# Patient Record
Sex: FEMALE | Race: WHITE | NOT HISPANIC OR LATINO
[De-identification: names, ages, dates, MRNs, and addresses within clinical notes are randomized per-mention and may not be internally consistent; named-entity substitution may affect disease eponyms.]

---

## 2020-06-26 ENCOUNTER — ERX REFILL RESPONSE (OUTPATIENT)
Age: 65
End: 2020-06-26

## 2020-06-26 RX ORDER — SUCRALFATE 1 G/1
PLEASE SEE ATTACHED FOR DETAILED DIRECTIONS TABLET ORAL
Qty: 120 | Refills: 2 | OUTPATIENT

## 2020-06-26 RX ORDER — SUCRALFATE 1 G/1
PLEASE SEE ATTACHED FOR DETAILED DIRECTIONS TABLET ORAL
Qty: 360 TABLET | Refills: 1 | OUTPATIENT

## 2020-07-07 ENCOUNTER — OFFICE VISIT (OUTPATIENT)
Dept: URBAN - METROPOLITAN AREA MEDICAL CENTER 28 | Facility: MEDICAL CENTER | Age: 65
End: 2020-07-07
Payer: COMMERCIAL

## 2020-07-07 DIAGNOSIS — K21.9 ACID REFLUX: ICD-10-CM

## 2020-07-07 DIAGNOSIS — R05 CHRONIC COUGH: ICD-10-CM

## 2020-07-07 PROCEDURE — 91010 ESOPHAGUS MOTILITY STUDY: CPT | Performed by: INTERNAL MEDICINE

## 2020-07-07 PROCEDURE — 91038 ESOPH IMPED FUNCT TEST > 1HR: CPT | Performed by: INTERNAL MEDICINE

## 2020-07-17 ENCOUNTER — OFFICE VISIT (OUTPATIENT)
Dept: URBAN - METROPOLITAN AREA TELEHEALTH 2 | Facility: TELEHEALTH | Age: 65
End: 2020-07-17
Payer: COMMERCIAL

## 2020-07-17 ENCOUNTER — DASHBOARD ENCOUNTERS (OUTPATIENT)
Age: 65
End: 2020-07-17

## 2020-07-17 DIAGNOSIS — K21.9 GERD (GASTROESOPHAGEAL REFLUX DISEASE): ICD-10-CM

## 2020-07-17 DIAGNOSIS — R05 CHRONIC COUGH: ICD-10-CM

## 2020-07-17 PROCEDURE — 3017F COLORECTAL CA SCREEN DOC REV: CPT | Performed by: INTERNAL MEDICINE

## 2020-07-17 PROCEDURE — 99214 OFFICE O/P EST MOD 30 MIN: CPT | Performed by: INTERNAL MEDICINE

## 2020-07-17 PROCEDURE — G8427 DOCREV CUR MEDS BY ELIG CLIN: HCPCS | Performed by: INTERNAL MEDICINE

## 2020-07-17 PROCEDURE — G8417 CALC BMI ABV UP PARAM F/U: HCPCS | Performed by: INTERNAL MEDICINE

## 2020-07-17 RX ORDER — SUCRALFATE 1 G/1
TAKE 1 TABLET (1 GRAM) BY ORAL ROUTE 4 TIMES PER DAY ON AN EMPTY STOMACH 1 HOUR BEFORE MEALS AND AT BEDTIME FOR 30 DAYS TABLET ORAL
OUTPATIENT
Start: 2020-05-22 | End: 2020-08-20

## 2020-07-17 RX ORDER — SUCRALFATE 1 G/1
PLEASE SEE ATTACHED FOR DETAILED DIRECTIONS TABLET ORAL
Qty: 360 TABLET | Refills: 1 | Status: ACTIVE | COMMUNITY

## 2020-07-17 RX ORDER — FAMOTIDINE 40 MG/1
TAKE 1 TABLET (40 MG) BY ORAL ROUTE ONCE DAILY AT BEDTIME FOR 90 DAYS TABLET ORAL 1
OUTPATIENT
Start: 2020-04-20 | End: 2020-10-17

## 2020-07-17 RX ORDER — PANTOPRAZOLE SODIUM 40 MG/1
TAKE 1 TABLET (40 MG) BY ORAL ROUTE 2 TIMES PER DAY FOR 90 DAYS TABLET, DELAYED RELEASE ORAL 2
OUTPATIENT
Start: 2020-05-06 | End: 2020-11-02

## 2020-07-17 RX ORDER — PANTOPRAZOLE SODIUM 40 MG/1
TAKE 1 TABLET (40 MG) BY ORAL ROUTE 2 TIMES PER DAY FOR 90 DAYS TABLET, DELAYED RELEASE ORAL 2
Qty: 180 | Refills: 1 | Status: ACTIVE | COMMUNITY
Start: 2020-05-06 | End: 2020-11-02

## 2020-07-17 RX ORDER — SUCRALFATE 1 G/1
TAKE 1 TABLET (1 GRAM) BY ORAL ROUTE 4 TIMES PER DAY ON AN EMPTY STOMACH 1 HOUR BEFORE MEALS AND AT BEDTIME FOR 30 DAYS TABLET ORAL
Qty: 120 | Refills: 2 | Status: ACTIVE | COMMUNITY
Start: 2020-05-22 | End: 2020-08-20

## 2020-07-17 RX ORDER — FAMOTIDINE 40 MG/1
TAKE 1 TABLET (40 MG) BY ORAL ROUTE ONCE DAILY AT BEDTIME FOR 90 DAYS TABLET ORAL 1
Qty: 90 | Refills: 1 | Status: ACTIVE | COMMUNITY
Start: 2020-04-20 | End: 2020-10-17

## 2020-07-17 NOTE — HPI-TODAY'S VISIT:
Patient is here in follow up. Denies new complaints Patient says the heartburn symptoms have not improved significantly. Has been taking the medications regularly Is following Lifestyle and Dietary modifications as recommended  The Esophageal manometry was d/w pt

## 2020-07-27 ENCOUNTER — ERX REFILL RESPONSE (OUTPATIENT)
Age: 65
End: 2020-07-27

## 2020-07-27 RX ORDER — FAMOTIDINE 40 MG/1
TAKE 1 TABLET BY MOUTH EVERYDAY AT BEDTIME TABLET, FILM COATED ORAL
Qty: 90 | Refills: 1 | OUTPATIENT

## 2020-07-27 RX ORDER — NIZATIDINE 300 MG/1
PLEASE SPECIFY DIRECTIONS, REFILLS AND QUANTITY CAPSULE ORAL
Qty: 1 CAPSULE | Refills: 1 | OUTPATIENT

## 2020-07-29 ENCOUNTER — ERX REFILL RESPONSE (OUTPATIENT)
Age: 65
End: 2020-07-29

## 2020-07-29 RX ORDER — FAMOTIDINE 40 MG/1
TAKE 1 TABLET BY MOUTH EVERYDAY AT BEDTIME TABLET, FILM COATED ORAL
Qty: 90 | Refills: 1 | OUTPATIENT

## 2020-07-29 RX ORDER — NIZATIDINE 300 MG/1
PLEASE SPECIFY DIRECTIONS, REFILLS AND QUANTITY CAPSULE ORAL
Qty: 1 CAPSULE | Refills: 1 | OUTPATIENT

## 2020-08-05 ENCOUNTER — ERX REFILL RESPONSE (OUTPATIENT)
Age: 65
End: 2020-08-05

## 2020-08-05 RX ORDER — NIZATIDINE 300 MG/1
PLEASE SPECIFY DIRECTIONS, REFILLS AND QUANTITY CAPSULE ORAL
Qty: 1 CAPSULE | Refills: 1 | OUTPATIENT

## 2020-08-05 RX ORDER — FAMOTIDINE 40 MG/1
TAKE 1 TABLET BY MOUTH EVERYDAY AT BEDTIME TABLET, FILM COATED ORAL
Qty: 1 | Refills: 1 | OUTPATIENT

## 2020-08-10 ENCOUNTER — TELEPHONE ENCOUNTER (OUTPATIENT)
Dept: URBAN - METROPOLITAN AREA CLINIC 78 | Facility: CLINIC | Age: 65
End: 2020-08-10

## 2020-08-10 RX ORDER — NIZATIDINE 300 MG/1
1 CAPSULE AT BEDTIME ORALLY ONCE A DAY CAPSULE ORAL
Qty: 90 | Refills: 1

## 2020-08-13 ENCOUNTER — ERX REFILL RESPONSE (OUTPATIENT)
Age: 65
End: 2020-08-13

## 2020-08-13 RX ORDER — FAMOTIDINE 40 MG/1
TAKE 1 TABLET BY MOUTH EVERYDAY AT BEDTIME TABLET, FILM COATED ORAL
Qty: 1 | Refills: 1 | OUTPATIENT

## 2020-08-13 RX ORDER — NIZATIDINE 300 MG/1
TAKE AS DIRECTED CAPSULE ORAL
Qty: 1 CAPSULE | Refills: 1 | OUTPATIENT

## 2020-08-19 ENCOUNTER — ERX REFILL RESPONSE (OUTPATIENT)
Age: 65
End: 2020-08-19

## 2020-08-19 RX ORDER — FAMOTIDINE 40 MG/1
TAKE 1 TABLET BY MOUTH EVERYDAY AT BEDTIME TABLET, FILM COATED ORAL
Qty: 1 | Refills: 1 | OUTPATIENT

## 2020-08-19 RX ORDER — NIZATIDINE 300 MG/1
TAKE AS DIRECTED CAPSULE ORAL
Qty: 1 CAPSULE | Refills: 1 | OUTPATIENT

## 2020-09-08 ENCOUNTER — ERX REFILL RESPONSE (OUTPATIENT)
Dept: URBAN - METROPOLITAN AREA CLINIC 78 | Facility: CLINIC | Age: 65
End: 2020-09-08

## 2020-09-08 RX ORDER — NIZATIDINE 300 MG/1
TAKE 1 CAPSULE AT BEDTIME ORALLY ONCE A DAY 90 DAYS CAPSULE ORAL
Qty: 90 CAPSULE | Refills: 2 | OUTPATIENT

## 2020-09-08 RX ORDER — NIZATIDINE 300 MG/1
1 CAPSULE AT BEDTIME ORALLY ONCE A DAY CAPSULE ORAL
Qty: 90 | Refills: 1 | OUTPATIENT

## 2020-09-25 ENCOUNTER — TELEPHONE ENCOUNTER (OUTPATIENT)
Dept: URBAN - METROPOLITAN AREA CLINIC 78 | Facility: CLINIC | Age: 65
End: 2020-09-25

## 2020-09-25 RX ORDER — FAMOTIDINE 40 MG/1
1 TABLET AT BEDTIME AS NEEDED TABLET ORAL ONCE A DAY
Qty: 90 | Refills: 1
Start: 2020-04-20

## 2020-09-28 ENCOUNTER — ERX REFILL RESPONSE (OUTPATIENT)
Dept: URBAN - METROPOLITAN AREA CLINIC 78 | Facility: CLINIC | Age: 65
End: 2020-09-28

## 2020-09-30 ENCOUNTER — ERX REFILL RESPONSE (OUTPATIENT)
Dept: URBAN - METROPOLITAN AREA CLINIC 78 | Facility: CLINIC | Age: 65
End: 2020-09-30

## 2020-10-04 ENCOUNTER — ERX REFILL RESPONSE (OUTPATIENT)
Dept: URBAN - METROPOLITAN AREA CLINIC 78 | Facility: CLINIC | Age: 65
End: 2020-10-04

## 2020-10-14 ENCOUNTER — ERX REFILL RESPONSE (OUTPATIENT)
Dept: URBAN - METROPOLITAN AREA CLINIC 78 | Facility: CLINIC | Age: 65
End: 2020-10-14

## 2020-10-29 ENCOUNTER — ERX REFILL RESPONSE (OUTPATIENT)
Dept: URBAN - METROPOLITAN AREA CLINIC 78 | Facility: CLINIC | Age: 65
End: 2020-10-29

## 2021-05-11 ENCOUNTER — ERX REFILL RESPONSE (OUTPATIENT)
Dept: URBAN - METROPOLITAN AREA CLINIC 78 | Facility: CLINIC | Age: 66
End: 2021-05-11

## 2021-05-11 RX ORDER — FAMOTIDINE 40 MG/1
1 TABLET AT BEDTIME AS NEEDED TABLET, FILM COATED ORAL ONCE A DAY
Qty: 180 | Refills: 1